# Patient Record
Sex: FEMALE | Race: BLACK OR AFRICAN AMERICAN | Employment: OTHER
[De-identification: names, ages, dates, MRNs, and addresses within clinical notes are randomized per-mention and may not be internally consistent; named-entity substitution may affect disease eponyms.]

---

## 2022-04-27 ENCOUNTER — HOSPITAL ENCOUNTER (OUTPATIENT)
Dept: MRI IMAGING | Facility: CLINIC | Age: 87
Discharge: HOME | End: 2022-04-29
Payer: COMMERCIAL

## 2022-04-27 DIAGNOSIS — G93.9 BRAIN LESION: ICD-10-CM

## 2022-04-27 PROCEDURE — 70544 MR ANGIOGRAPHY HEAD W/O DYE: CPT

## 2022-04-27 PROCEDURE — 70551 MRI BRAIN STEM W/O DYE: CPT

## 2022-09-09 ENCOUNTER — HOSPITAL ENCOUNTER (OUTPATIENT)
Dept: CT IMAGING | Facility: CLINIC | Age: 87
Discharge: HOME | End: 2022-09-11
Payer: COMMERCIAL

## 2022-09-09 ENCOUNTER — TRANSCRIBE ORDERS (OUTPATIENT)
Dept: ADMINISTRATIVE | Age: 87
End: 2022-09-09

## 2022-09-09 DIAGNOSIS — R93.89 ABNORMAL RADIOLOGICAL FINDINGS IN SKIN AND SUBCUTANEOUS TISSUE: ICD-10-CM

## 2022-09-09 DIAGNOSIS — Z91.81 PERSONAL HISTORY OF FALL: Primary | ICD-10-CM

## 2022-09-09 DIAGNOSIS — M25.551 RIGHT HIP PAIN: ICD-10-CM

## 2022-09-09 DIAGNOSIS — Z91.81 PERSONAL HISTORY OF FALL: ICD-10-CM

## 2022-09-09 PROCEDURE — 73700 CT LOWER EXTREMITY W/O DYE: CPT

## 2024-04-13 ENCOUNTER — HOSPITAL ENCOUNTER (EMERGENCY)
Facility: CLINIC | Age: 89
Discharge: HOME | End: 2024-04-13
Attending: EMERGENCY MEDICINE
Payer: COMMERCIAL

## 2024-04-13 VITALS
BODY MASS INDEX: 24.8 KG/M2 | HEIGHT: 63 IN | DIASTOLIC BLOOD PRESSURE: 72 MMHG | HEART RATE: 62 BPM | SYSTOLIC BLOOD PRESSURE: 186 MMHG | OXYGEN SATURATION: 98 % | TEMPERATURE: 98.2 F | RESPIRATION RATE: 16 BRPM | WEIGHT: 140 LBS

## 2024-04-13 DIAGNOSIS — B02.9 HERPES ZOSTER WITHOUT COMPLICATION: Primary | ICD-10-CM

## 2024-04-13 PROCEDURE — 99283 EMERGENCY DEPT VISIT LOW MDM: CPT

## 2024-04-13 RX ORDER — VALACYCLOVIR HYDROCHLORIDE 500 MG/1
1000 TABLET, FILM COATED ORAL ONCE
Status: DISCONTINUED | OUTPATIENT
Start: 2024-04-13 | End: 2024-04-13

## 2024-04-13 RX ORDER — VALACYCLOVIR HYDROCHLORIDE 1 G/1
1000 TABLET, FILM COATED ORAL 3 TIMES DAILY
Qty: 21 TABLET | Refills: 0 | Status: SHIPPED | OUTPATIENT
Start: 2024-04-13 | End: 2024-04-20

## 2024-04-13 NOTE — DISCHARGE INSTR - COC
Continuity of Care Form    Patient Name: Susys Neil   :  1932  MRN:  [id number]    Admit date:  2024  Discharge date:  ***    Code Status Order: No Order   Advance Directives:     Admitting Physician:  No admitting provider for patient encounter.  PCP: Amarilis Sousa MD    Discharging Nurse: ***  Discharging Hospital Unit/Room#: DAVID/DAVID  Discharging Unit Phone Number: ***    Emergency Contact:   Extended Emergency Contact Information  Primary Emergency Contact: Clarisse Bella  Home Phone: [phone]  Mobile Phone: [phone]  Relation: Child  Secondary Emergency Contact: Doug Neil  Home Phone: [phone]  Mobile Phone: [phone]  Relation: Child    Past Surgical History:  No past surgical history on file.    Immunization History:     There is no immunization history on file for this patient.    Active Problems:  There is no problem list on file for this patient.      Isolation/Infection:   Isolation            No Isolation          Patient Infection Status       None to display            Nurse Assessment:  Last Vital Signs: BP (!) 186/72   Pulse 62   Temp 98.2 °F (36.8 °C) (Oral)   Resp 16   Ht 1.6 m (5' 3\")   Wt 63.5 kg (140 lb)   SpO2 98%   BMI 24.80 kg/m²     Last documented pain score (0-10 scale):    Last Weight:   Wt Readings from Last 1 Encounters:   24 63.5 kg (140 lb)     Mental Status:  {IP PT MENTAL STATUS:}    IV Access:  { NILSA IV ACCESS:284853704}    Nursing Mobility/ADLs:  Walking   {CHP DME ADLs:282206196}  Transfer  {CHP DME ADLs:912193814}  Bathing  {CHP DME ADLs:331359414}  Dressing  {CHP DME ADLs:426262718}  Toileting  {CHP DME ADLs:391426708}  Feeding  {CHP DME ADLs:351033951}  Med Admin  {CHP DME ADLs:853045974}  Med Delivery   { NILSA MED Delivery:515281093}    Wound Care Documentation and Therapy:        Elimination:  Continence:   Bowel: {YES / NO:}  Bladder: {YES / NO:}  Urinary Catheter: {Urinary Catheter:757657333}

## 2024-04-13 NOTE — ED PROVIDER NOTES
[facility]  eMERGENCY dEPARTMENT eNCOUnter      Pt Name: Sussy Neil  MRN: [id number]  Birthdate 12/7/1932  Date of evaluation: 4/13/2024  Provider: Cody Marie PA-C    CHIEF COMPLAINT       Chief Complaint   Patient presents with    Rash     Left side of head           HISTORY OF PRESENT ILLNESS  (Location/Symptom, Timing/Onset, Context/Setting, Quality, Duration, Modifying Factors, Severity.)   Sussy Neil is a 89 y.o. female who presents to the emergency department with son c/o rash to left forehead. States rash started 3 days ago. Denies any trouble breathing or swallowing. Denies any abd pain, cp, sob, n/v/d/c. Denies any fevers.        Quality: itchy  Duration: constant  Modifying Factors: none  Severity: mild    Nursing Notes were reviewed.    REVIEW OF SYSTEMS    (2-9 systems for level 4, 10 or more for level 5)     Review of Systems   C/o rash  Denies trouble breathing  Denies cp  Denies fevers.     Except as noted above the remainder of the review of systems was reviewed and negative.       PAST MEDICAL HISTORY   No past medical history on file.  None otherwise stated in nurses notes    SURGICAL HISTORY     No past surgical history on file.  None otherwise stated in nurses notes    CURRENT MEDICATIONS       Previous Medications    No medications on file       ALLERGIES     Patient has no allergy information on record.    FAMILY HISTORY     No family history on file.  No family status information on file.      None otherwise stated in nurses notes    SOCIAL HISTORY         lives at home with others     PHYSICAL EXAM    (up to 7 for level 4, 8 or more for level 5)     ED Triage Vitals [04/13/24 1536]   BP Temp Temp Source Pulse Respirations SpO2 Height Weight - Scale   (!) 186/72 98.2 °F (36.8 °C) Oral 62 16 98 % 1.6 m (5' 3\") 63.5 kg (140 lb)       Physical Exam   Nursing note and vitals reviewed.  Constitutional: Oriented to person, place, and time and well-developed,

## 2024-04-13 NOTE — ED PROVIDER NOTES
[facility]    [address] [facility] [address]  Phone: [phone]  Emergency Department  Faculty Attestation    I performed a history and physical examination of the patient and discussed management with the mid level provider. I reviewed the mid level provider's note and agree with the documented findings and plan of care. Any areas of disagreement are noted on the chart. I was personally present for the key portions of any procedures. I have documented in the chart those procedures where I was not present during the key portions. I have reviewed the emergency nurses triage note. I agree with the chief complaint, past medical history, past surgical history, allergies, medications, social and family history as documented unless otherwise noted below. Documentation of the HPI, Physical Exam and Medical Decision Making performed by medical students or scribes is based on my personal performance of the HPI, PE and MDM. For Physician Assistant/ Nurse Practitioner cases/documentation I have personally evaluated this patient and have completed at least one if not all key elements of the E/M (history, physical exam, and MDM). Additional findings are as noted.      Primary Care Physician:  Amarilis Sousa MD    CHIEF COMPLAINT       Chief Complaint   Patient presents with    Rash     Left side of head       RECENT VITALS:   Temp: 98.2 °F (36.8 °C),  Pulse: 62, Respirations: 16, BP: (!) 186/72    LABS:  Labs Reviewed - No data to display      PERTINENT ATTENDING PHYSICIAN COMMENTS:    The patient presents with concern for shingles rash.  She has a rash on her left anterior forehead and scalp.  She was recently being treated for an infection of her left ear.  She is currently on antibiotics.  Her rash started 3 days ago    On exam, the patient has scabbed lesions on the anterior forehead.  She does not have any involvement of the tip of the nose.  She has no conjunctival injection or evidence of eye